# Patient Record
Sex: FEMALE
[De-identification: names, ages, dates, MRNs, and addresses within clinical notes are randomized per-mention and may not be internally consistent; named-entity substitution may affect disease eponyms.]

---

## 2017-09-12 ENCOUNTER — APPOINTMENT (OUTPATIENT)
Dept: OBGYN | Facility: CLINIC | Age: 72
End: 2017-09-12
Payer: MEDICARE

## 2017-09-12 VITALS
DIASTOLIC BLOOD PRESSURE: 80 MMHG | SYSTOLIC BLOOD PRESSURE: 154 MMHG | BODY MASS INDEX: 18.12 KG/M2 | WEIGHT: 96 LBS | HEIGHT: 61 IN

## 2017-09-12 DIAGNOSIS — Z86.79 PERSONAL HISTORY OF OTHER DISEASES OF THE CIRCULATORY SYSTEM: ICD-10-CM

## 2017-09-12 DIAGNOSIS — Z01.419 ENCOUNTER FOR GYNECOLOGICAL EXAMINATION (GENERAL) (ROUTINE) W/OUT ABNORMAL FINDINGS: ICD-10-CM

## 2017-09-12 DIAGNOSIS — Z82.5 FAMILY HISTORY OF ASTHMA AND OTHER CHRONIC LOWER RESPIRATORY DISEASES: ICD-10-CM

## 2017-09-12 DIAGNOSIS — Z80.3 FAMILY HISTORY OF MALIGNANT NEOPLASM OF BREAST: ICD-10-CM

## 2017-09-12 DIAGNOSIS — Z86.39 PERSONAL HISTORY OF OTHER ENDOCRINE, NUTRITIONAL AND METABOLIC DISEASE: ICD-10-CM

## 2017-09-12 DIAGNOSIS — Z80.8 FAMILY HISTORY OF MALIGNANT NEOPLASM OF OTHER ORGANS OR SYSTEMS: ICD-10-CM

## 2017-09-12 DIAGNOSIS — Z78.9 OTHER SPECIFIED HEALTH STATUS: ICD-10-CM

## 2017-09-12 PROCEDURE — G0101: CPT

## 2017-09-13 PROBLEM — Z80.3 FAMILY HISTORY OF MALIGNANT NEOPLASM OF BREAST: Status: ACTIVE | Noted: 2017-09-13

## 2017-09-13 PROBLEM — Z86.79 HISTORY OF HYPERTENSION: Status: RESOLVED | Noted: 2017-09-13 | Resolved: 2017-09-13

## 2017-09-13 PROBLEM — Z80.8 FAMILY HISTORY OF BONE CANCER: Status: ACTIVE | Noted: 2017-09-13

## 2017-09-13 PROBLEM — Z86.39 HISTORY OF HYPERCHOLESTEROLEMIA: Status: RESOLVED | Noted: 2017-09-13 | Resolved: 2017-09-13

## 2017-09-13 PROBLEM — Z82.5 FAMILY HISTORY OF EMPHYSEMA: Status: ACTIVE | Noted: 2017-09-13

## 2017-09-13 PROBLEM — Z78.9 NON-SMOKER: Status: ACTIVE | Noted: 2017-09-13

## 2017-09-13 RX ORDER — ATENOLOL 50 MG/1
TABLET ORAL
Refills: 0 | Status: ACTIVE | COMMUNITY

## 2017-09-13 RX ORDER — NEBIVOLOL HYDROCHLORIDE 20 MG/1
TABLET ORAL
Refills: 0 | Status: ACTIVE | COMMUNITY

## 2017-09-13 RX ORDER — ICOSAPENT ETHYL 500 MG/1
CAPSULE ORAL
Refills: 0 | Status: ACTIVE | COMMUNITY

## 2017-09-13 RX ORDER — AMLODIPINE BESYLATE 5 MG/1
TABLET ORAL
Refills: 0 | Status: ACTIVE | COMMUNITY

## 2017-09-19 LAB — CYTOLOGY CVX/VAG DOC THIN PREP: NORMAL

## 2020-12-15 PROBLEM — Z01.419 ENCOUNTER FOR GYNECOLOGICAL EXAMINATION WITHOUT ABNORMAL FINDING: Status: RESOLVED | Noted: 2017-09-13 | Resolved: 2020-12-15

## 2022-11-14 ENCOUNTER — OFFICE (OUTPATIENT)
Dept: URBAN - METROPOLITAN AREA CLINIC 90 | Facility: CLINIC | Age: 77
Setting detail: OPHTHALMOLOGY
End: 2022-11-14
Payer: MEDICARE

## 2022-11-14 DIAGNOSIS — H16.223: ICD-10-CM

## 2022-11-14 DIAGNOSIS — H40.013: ICD-10-CM

## 2022-11-14 DIAGNOSIS — E78.01: ICD-10-CM

## 2022-11-14 DIAGNOSIS — H35.371: ICD-10-CM

## 2022-11-14 DIAGNOSIS — H18.513: ICD-10-CM

## 2022-11-14 DIAGNOSIS — H43.393: ICD-10-CM

## 2022-11-14 DIAGNOSIS — H25.13: ICD-10-CM

## 2022-11-14 PROCEDURE — 92250 FUNDUS PHOTOGRAPHY W/I&R: CPT | Performed by: OPHTHALMOLOGY

## 2022-11-14 PROCEDURE — 92014 COMPRE OPH EXAM EST PT 1/>: CPT | Performed by: OPHTHALMOLOGY

## 2022-11-14 ASSESSMENT — REFRACTION_MANIFEST
OD_VA1: 20/20-1
OS_ADD: +2.75
OS_CYLINDER: -0.50
OD_VA1: 20/20
OD_SPHERE: +3.75
OS_AXIS: 100
OS_VA2: 20/20
OD_VA2: 20/20
OD_ADD: +3.00
OD_AXIS: 090
OS_CYLINDER: -0.75
OD_AXIS: 095
OD_AXIS: 100
OD_VA2: 20/30
OS_ADD: +3.00
OS_VA2: 20/30
OD_CYLINDER: -1.75
OD_CYLINDER: -1.75
OS_SPHERE: +3.25
OS_VA1: 20/20
OD_SPHERE: +3.50
OD_CYLINDER: -1.00
OS_VA1: 20/30
OS_SPHERE: +3.25
OS_CYLINDER: -0.75
OS_AXIS: 090
OS_VA1: 20/20
OD_VA1: 20/30+2
OD_ADD: +2.75
OS_AXIS: 105
OD_SPHERE: +2.75
OS_SPHERE: +3.25

## 2022-11-14 ASSESSMENT — REFRACTION_AUTOREFRACTION
OS_CYLINDER: -0.50
OD_SPHERE: +3.25
OD_AXIS: 093
OS_AXIS: 088
OS_SPHERE: +3.25
OD_CYLINDER: -1.50

## 2022-11-14 ASSESSMENT — REFRACTION_CURRENTRX
OD_SPHERE: +3.00
OD_ADD: +2.75
OS_AXIS: 090
OD_SPHERE: +2.75
OD_SPHERE: +2.00
OS_CYLINDER: -0.75
OS_AXIS: 102
OD_VPRISM_DIRECTION: PROGS
OS_CYLINDER: -0.50
OD_VPRISM_DIRECTION: PROGS
OD_CYLINDER: SPH
OS_CYLINDER: -0.75
OD_AXIS: 100
OD_ADD: +3.00
OS_VPRISM_DIRECTION: PROGS
OD_ADD: +2.75
OS_SPHERE: +3.50
OD_CYLINDER: -1.00
OD_OVR_VA: 20/
OD_OVR_VA: 20/
OS_VPRISM_DIRECTION: PROGS
OS_OVR_VA: 20/
OD_VPRISM_DIRECTION: PROGS
OS_SPHERE: +3.25
OS_ADD: +2.75
OS_SPHERE: +2.00
OD_OVR_VA: 20/
OS_OVR_VA: 20/
OS_ADD: +3.00
OD_AXIS: 096
OS_VPRISM_DIRECTION: PROGS
OS_AXIS: 115
OS_OVR_VA: 20/
OS_ADD: +2.75
OD_CYLINDER: -1.00

## 2022-11-14 ASSESSMENT — SPHEQUIV_DERIVED
OS_SPHEQUIV: 2.875
OD_SPHEQUIV: 2.5
OS_SPHEQUIV: 3
OD_SPHEQUIV: 2.875
OD_SPHEQUIV: 2.625
OD_SPHEQUIV: 2.25
OS_SPHEQUIV: 2.875
OS_SPHEQUIV: 3

## 2022-11-14 ASSESSMENT — CONFRONTATIONAL VISUAL FIELD TEST (CVF)
OD_FINDINGS: FULL
OS_FINDINGS: FULL

## 2022-11-14 ASSESSMENT — TONOMETRY
OD_IOP_MMHG: 12
OS_IOP_MMHG: 12

## 2022-11-14 ASSESSMENT — AXIALLENGTH_DERIVED
OS_AL: 22.1497
OS_AL: 22.1497
OD_AL: 22.4103
OD_AL: 22.2792
OD_AL: 22.1927
OS_AL: 22.1927
OS_AL: 22.1927
OD_AL: 22.3227

## 2022-11-14 ASSESSMENT — KERATOMETRY
OD_AXISANGLE_DEGREES: 044
OS_AXISANGLE_DEGREES: 101
OD_K2POWER_DIOPTERS: 44.75
OS_K1POWER_DIOPTERS: 44.00
METHOD_AUTO_MANUAL: AUTO
OS_K2POWER_DIOPTERS: 45.00
OD_K1POWER_DIOPTERS: 44.25

## 2022-11-14 ASSESSMENT — SUPERFICIAL PUNCTATE KERATITIS (SPK)
OD_SPK: T
OS_SPK: T

## 2022-11-14 ASSESSMENT — CORNEAL DYSTROPHY - POSTERIOR
OS_POSTERIORDYSTROPHY: T GUTTATA
OD_POSTERIORDYSTROPHY: T GUTTATA

## 2022-11-14 ASSESSMENT — VISUAL ACUITY
OS_BCVA: 20/20-1
OD_BCVA: 20/20-1

## 2022-11-14 ASSESSMENT — PACHYMETRY
OS_CT_CORRECTION: 3
OD_CT_CORRECTION: 5
OD_CT_UM: 479
OS_CT_UM: 503

## 2023-05-15 ENCOUNTER — OFFICE (OUTPATIENT)
Dept: URBAN - METROPOLITAN AREA CLINIC 90 | Facility: CLINIC | Age: 78
Setting detail: OPHTHALMOLOGY
End: 2023-05-15
Payer: MEDICARE

## 2023-05-15 DIAGNOSIS — H40.013: ICD-10-CM

## 2023-05-15 PROBLEM — H43.391 VITREOUS FLOATERS; RIGHT EYE: Status: ACTIVE | Noted: 2023-05-15

## 2023-05-15 PROCEDURE — 92014 COMPRE OPH EXAM EST PT 1/>: CPT | Performed by: OPHTHALMOLOGY

## 2023-05-15 PROCEDURE — 92133 CPTRZD OPH DX IMG PST SGM ON: CPT | Performed by: OPHTHALMOLOGY

## 2023-05-15 PROCEDURE — 92083 EXTENDED VISUAL FIELD XM: CPT | Performed by: OPHTHALMOLOGY

## 2023-05-15 ASSESSMENT — AXIALLENGTH_DERIVED
OD_AL: 22.1927
OD_AL: 22.2792
OD_AL: 22.4103
OS_AL: 22.1927
OS_AL: 22.1927
OD_AL: 22.2792
OS_AL: 22.1927
OS_AL: 22.1497

## 2023-05-15 ASSESSMENT — REFRACTION_CURRENTRX
OD_VPRISM_DIRECTION: PROGS
OS_OVR_VA: 20/
OD_SPHERE: +3.00
OD_ADD: +2.75
OS_SPHERE: +2.00
OS_ADD: +2.75
OS_CYLINDER: -0.75
OS_CYLINDER: -0.75
OD_ADD: +3.00
OS_CYLINDER: -0.75
OD_ADD: +2.75
OS_OVR_VA: 20/
OS_AXIS: 090
OS_ADD: +3.00
OS_AXIS: 102
OS_VPRISM_DIRECTION: PROGS
OD_VPRISM_DIRECTION: PROGS
OD_OVR_VA: 20/
OD_AXIS: 100
OS_OVR_VA: 20/
OS_AXIS: 115
OD_CYLINDER: SPH
OS_CYLINDER: -0.50
OS_SPHERE: +3.50
OS_VPRISM_DIRECTION: PROGS
OD_SPHERE: +3.00
OD_SPHERE: +2.75
OS_AXIS: 102
OS_ADD: +2.75
OS_VPRISM_DIRECTION: PROGS
OD_VPRISM_DIRECTION: PROGS
OS_SPHERE: +3.25
OS_ADD: +2.50
OS_SPHERE: +3.50
OD_OVR_VA: 20/
OD_ADD: +2.50
OD_AXIS: 098
OD_CYLINDER: -1.00
OD_SPHERE: +2.00
OD_CYLINDER: -1.00
OD_CYLINDER: -1.25
OD_AXIS: 096
OD_OVR_VA: 20/

## 2023-05-15 ASSESSMENT — REFRACTION_MANIFEST
OD_VA2: 20/20
OD_CYLINDER: -1.75
OD_VA1: 20/20
OS_VA2: 20/30
OS_SPHERE: +3.25
OD_SPHERE: +2.75
OS_ADD: +2.75
OS_CYLINDER: -0.75
OD_VA1: 20/30+2
OD_ADD: +3.00
OD_AXIS: 090
OD_CYLINDER: -1.00
OS_VA1: 20/20
OD_VA1: 20/20-1
OS_CYLINDER: -0.75
OS_SPHERE: +3.25
OS_AXIS: 100
OS_VA2: 20/20
OS_VA1: 20/20
OS_AXIS: 105
OS_AXIS: 090
OD_AXIS: 100
OD_ADD: +2.75
OS_ADD: +3.00
OD_SPHERE: +3.50
OS_VA1: 20/30
OS_SPHERE: +3.25
OD_VA2: 20/30
OD_CYLINDER: -1.75
OD_SPHERE: +3.75
OD_AXIS: 095
OS_CYLINDER: -0.50

## 2023-05-15 ASSESSMENT — CORNEAL DYSTROPHY - POSTERIOR
OS_POSTERIORDYSTROPHY: T GUTTATA
OD_POSTERIORDYSTROPHY: T GUTTATA

## 2023-05-15 ASSESSMENT — REFRACTION_AUTOREFRACTION
OD_SPHERE: +3.75
OD_CYLINDER: -2.25
OD_AXIS: 093
OS_AXIS: 090
OS_CYLINDER: -1.25
OS_SPHERE: +3.50

## 2023-05-15 ASSESSMENT — PACHYMETRY
OS_CT_UM: 503
OD_CT_UM: 479
OS_CT_CORRECTION: 3
OD_CT_CORRECTION: 5

## 2023-05-15 ASSESSMENT — KERATOMETRY
OS_K2POWER_DIOPTERS: 44.75
OS_K1POWER_DIOPTERS: 44.25
OD_K1POWER_DIOPTERS: 44.00
OD_AXISANGLE_DEGREES: 020
OD_K2POWER_DIOPTERS: 45.00
METHOD_AUTO_MANUAL: AUTO
OS_AXISANGLE_DEGREES: 086

## 2023-05-15 ASSESSMENT — SPHEQUIV_DERIVED
OD_SPHEQUIV: 2.625
OD_SPHEQUIV: 2.625
OS_SPHEQUIV: 2.875
OD_SPHEQUIV: 2.25
OD_SPHEQUIV: 2.875
OS_SPHEQUIV: 2.875
OS_SPHEQUIV: 3
OS_SPHEQUIV: 2.875

## 2023-05-15 ASSESSMENT — TONOMETRY
OS_IOP_MMHG: 13
OD_IOP_MMHG: 12

## 2023-05-15 ASSESSMENT — VISUAL ACUITY
OD_BCVA: 20/20-1
OS_BCVA: 20/30

## 2023-05-15 ASSESSMENT — CONFRONTATIONAL VISUAL FIELD TEST (CVF)
OD_FINDINGS: FULL
OS_FINDINGS: FULL

## 2023-08-16 ENCOUNTER — OFFICE (OUTPATIENT)
Dept: URBAN - METROPOLITAN AREA CLINIC 90 | Facility: CLINIC | Age: 78
Setting detail: OPHTHALMOLOGY
End: 2023-08-16
Payer: MEDICARE

## 2023-08-16 DIAGNOSIS — H43.393: ICD-10-CM

## 2023-08-16 DIAGNOSIS — H18.513: ICD-10-CM

## 2023-08-16 DIAGNOSIS — H25.13: ICD-10-CM

## 2023-08-16 DIAGNOSIS — E78.01: ICD-10-CM

## 2023-08-16 DIAGNOSIS — H52.4: ICD-10-CM

## 2023-08-16 DIAGNOSIS — H40.013: ICD-10-CM

## 2023-08-16 DIAGNOSIS — H35.371: ICD-10-CM

## 2023-08-16 PROCEDURE — 92015 DETERMINE REFRACTIVE STATE: CPT | Performed by: OPHTHALMOLOGY

## 2023-08-16 PROCEDURE — 92012 INTRM OPH EXAM EST PATIENT: CPT | Performed by: OPHTHALMOLOGY

## 2023-08-16 PROCEDURE — 92083 EXTENDED VISUAL FIELD XM: CPT | Performed by: OPHTHALMOLOGY

## 2023-08-16 ASSESSMENT — REFRACTION_CURRENTRX
OD_CYLINDER: -1.00
OS_SPHERE: +2.00
OS_OVR_VA: 20/
OD_SPHERE: +3.00
OS_AXIS: 102
OD_CYLINDER: -1.25
OD_OVR_VA: 20/
OS_AXIS: 115
OS_SPHERE: +3.50
OD_AXIS: 098
OS_ADD: +2.75
OS_ADD: +3.00
OD_AXIS: 098
OS_AXIS: 107
OS_OVR_VA: 20/
OD_VPRISM_DIRECTION: PROGS
OS_OVR_VA: 20/
OD_VPRISM_DIRECTION: PROGS
OS_CYLINDER: -0.75
OD_OVR_VA: 20/
OD_ADD: +2.75
OS_SPHERE: +3.25
OD_SPHERE: +2.00
OD_ADD: +3.00
OS_CYLINDER: -0.50
OD_CYLINDER: SPH
OD_AXIS: 100
OS_ADD: +2.50
OS_VPRISM_DIRECTION: PROGS
OS_SPHERE: +3.25
OS_ADD: +2.75
OD_ADD: +2.75
OS_VPRISM_DIRECTION: PROGS
OS_AXIS: 090
OD_SPHERE: +3.00
OD_OVR_VA: 20/
OD_VPRISM_DIRECTION: PROGS
OS_VPRISM_DIRECTION: PROGS
OS_CYLINDER: -0.75
OS_CYLINDER: -0.75
OD_ADD: +2.50
OD_SPHERE: +2.75
OD_CYLINDER: -0.75

## 2023-08-16 ASSESSMENT — KERATOMETRY
OS_AXISANGLE_DEGREES: 092
OS_K1POWER_DIOPTERS: 44.00
OD_K1POWER_DIOPTERS: 44.50
METHOD_AUTO_MANUAL: AUTO
OS_K2POWER_DIOPTERS: 45.00
OD_K2POWER_DIOPTERS: 45.00
OD_AXISANGLE_DEGREES: 061

## 2023-08-16 ASSESSMENT — SPHEQUIV_DERIVED
OS_SPHEQUIV: 2.875
OD_SPHEQUIV: 2.875
OD_SPHEQUIV: 2.625
OD_SPHEQUIV: 2.25
OS_SPHEQUIV: 2.875
OS_SPHEQUIV: 3
OD_SPHEQUIV: 2.625
OS_SPHEQUIV: 2.875
OS_SPHEQUIV: 2.875
OD_SPHEQUIV: 2.625

## 2023-08-16 ASSESSMENT — REFRACTION_MANIFEST
OD_VA1: 20/20-1
OS_SPHERE: +3.25
OS_VA2: 20/20
OD_CYLINDER: -0.75
OS_ADD: +3.00
OD_ADD: +2.75
OS_VA2: 20/25(J1)
OD_AXIS: 100
OD_ADD: +3.00
OS_CYLINDER: -0.75
OD_AXIS: 100
OS_CYLINDER: -0.75
OD_VA2: 20/20
OS_ADD: +2.50
OS_SPHERE: +3.25
OD_CYLINDER: -1.75
OD_SPHERE: +3.75
OS_AXIS: 090
OS_VA1: 20/20
OS_AXIS: 100
OS_VA1: 20/20-2
OS_SPHERE: +3.25
OS_AXIS: 105
OD_SPHERE: +3.00
OD_SPHERE: +3.50
OS_VA2: 20/30
OS_VA1: 20/20
OD_AXIS: 095
OS_SPHERE: +3.25
OS_CYLINDER: -0.50
OD_VA2: 20/30
OS_ADD: +2.75
OD_CYLINDER: -1.00
OS_AXIS: 090
OD_VA2: 20/25(J1)
OD_AXIS: 090
OS_VA1: 20/30
OD_SPHERE: +2.75
OD_VA1: 20/20
OD_VA1: 20/30+2
OD_ADD: +2.50
OS_CYLINDER: -0.75
OD_VA1: 20/25+
OD_CYLINDER: -1.75

## 2023-08-16 ASSESSMENT — CORNEAL DYSTROPHY - POSTERIOR
OS_POSTERIORDYSTROPHY: T GUTTATA
OD_POSTERIORDYSTROPHY: T GUTTATA

## 2023-08-16 ASSESSMENT — REFRACTION_AUTOREFRACTION
OD_SPHERE: +3.50
OS_CYLINDER: -0.75
OD_AXIS: 095
OD_CYLINDER: -1.75
OS_SPHERE: +3.25
OS_AXIS: 085

## 2023-08-16 ASSESSMENT — AXIALLENGTH_DERIVED
OS_AL: 22.1927
OD_AL: 22.1976
OS_AL: 22.1927
OD_AL: 22.1117
OS_AL: 22.1927
OS_AL: 22.1497
OD_AL: 22.1976
OD_AL: 22.3277
OD_AL: 22.1976
OS_AL: 22.1927

## 2023-08-16 ASSESSMENT — TONOMETRY
OD_IOP_MMHG: 12
OS_IOP_MMHG: 12

## 2023-08-16 ASSESSMENT — PACHYMETRY
OD_CT_UM: 479
OD_CT_CORRECTION: 5
OS_CT_UM: 503
OS_CT_CORRECTION: 3

## 2023-08-16 ASSESSMENT — VISUAL ACUITY
OD_BCVA: 20/20-2
OS_BCVA: 20/40-2

## 2023-08-16 ASSESSMENT — CONFRONTATIONAL VISUAL FIELD TEST (CVF)
OD_FINDINGS: FULL
OS_FINDINGS: FULL

## 2024-02-21 ENCOUNTER — OFFICE (OUTPATIENT)
Dept: URBAN - METROPOLITAN AREA CLINIC 90 | Facility: CLINIC | Age: 79
Setting detail: OPHTHALMOLOGY
End: 2024-02-21
Payer: MEDICARE

## 2024-02-21 DIAGNOSIS — E78.01: ICD-10-CM

## 2024-02-21 DIAGNOSIS — H35.033: ICD-10-CM

## 2024-02-21 DIAGNOSIS — H43.393: ICD-10-CM

## 2024-02-21 DIAGNOSIS — H25.13: ICD-10-CM

## 2024-02-21 DIAGNOSIS — H40.013: ICD-10-CM

## 2024-02-21 DIAGNOSIS — H18.513: ICD-10-CM

## 2024-02-21 DIAGNOSIS — H35.371: ICD-10-CM

## 2024-02-21 PROCEDURE — 92014 COMPRE OPH EXAM EST PT 1/>: CPT | Performed by: OPHTHALMOLOGY

## 2024-02-21 PROCEDURE — 92250 FUNDUS PHOTOGRAPHY W/I&R: CPT | Performed by: OPHTHALMOLOGY

## 2024-02-21 ASSESSMENT — REFRACTION_MANIFEST
OS_CYLINDER: -0.75
OS_VA2: 20/25(J1)
OD_CYLINDER: -0.75
OD_ADD: +2.75
OD_VA2: 20/20
OS_SPHERE: +3.25
OD_ADD: +3.00
OS_CYLINDER: -0.75
OS_VA1: 20/20
OD_VA2: 20/25(J1)
OD_CYLINDER: -1.75
OS_ADD: +2.50
OS_VA2: 20/20
OS_ADD: +3.00
OS_AXIS: 090
OD_ADD: +2.50
OS_VA1: 20/30
OD_VA1: 20/20
OD_SPHERE: +3.00
OD_AXIS: 095
OD_CYLINDER: -1.00
OS_SPHERE: +3.25
OD_AXIS: 100
OS_AXIS: 105
OD_SPHERE: +2.75
OS_AXIS: 100
OD_VA1: 20/25+
OS_VA1: 20/20-2
OS_CYLINDER: -0.75
OS_ADD: +2.75
OS_VA2: 20/30
OS_AXIS: 090
OS_SPHERE: +3.25
OS_CYLINDER: -0.50
OD_CYLINDER: -1.75
OD_VA1: 20/30+2
OD_SPHERE: +3.75
OS_VA1: 20/20
OD_VA2: 20/30
OD_SPHERE: +3.50
OD_VA1: 20/20-1
OD_AXIS: 090
OS_SPHERE: +3.25
OD_AXIS: 100

## 2024-02-21 ASSESSMENT — REFRACTION_CURRENTRX
OD_CYLINDER: -1.25
OS_CYLINDER: -0.75
OD_OVR_VA: 20/
OD_SPHERE: +2.75
OS_SPHERE: +2.00
OS_ADD: +2.75
OD_AXIS: 098
OS_OVR_VA: 20/
OD_SPHERE: +3.00
OS_OVR_VA: 20/
OS_AXIS: 090
OD_CYLINDER: SPH
OS_VPRISM_DIRECTION: PROGS
OS_ADD: +2.50
OD_AXIS: 100
OS_SPHERE: +3.25
OD_ADD: +3.00
OD_CYLINDER: -1.00
OS_AXIS: 107
OD_CYLINDER: -0.75
OD_ADD: +2.75
OS_CYLINDER: -0.75
OD_SPHERE: +2.00
OS_CYLINDER: -0.50
OD_VPRISM_DIRECTION: PROGS
OD_OVR_VA: 20/
OS_ADD: +3.00
OS_OVR_VA: 20/
OD_SPHERE: +3.00
OD_OVR_VA: 20/
OS_SPHERE: +3.25
OS_VPRISM_DIRECTION: PROGS
OS_ADD: +2.75
OS_AXIS: 102
OS_AXIS: 115
OS_CYLINDER: -0.75
OS_SPHERE: +3.50
OD_VPRISM_DIRECTION: PROGS
OD_VPRISM_DIRECTION: PROGS
OD_ADD: +2.50
OD_ADD: +2.75
OD_AXIS: 098
OS_VPRISM_DIRECTION: PROGS

## 2024-02-21 ASSESSMENT — REFRACTION_AUTOREFRACTION
OS_AXIS: 097
OD_CYLINDER: -2.00
OS_CYLINDER: -0.75
OD_AXIS: 091
OD_SPHERE: +3.75
OS_SPHERE: +3.25

## 2024-02-21 ASSESSMENT — SPHEQUIV_DERIVED
OD_SPHEQUIV: 2.625
OD_SPHEQUIV: 2.75
OS_SPHEQUIV: 2.875
OD_SPHEQUIV: 2.875
OS_SPHEQUIV: 2.875
OS_SPHEQUIV: 2.875
OD_SPHEQUIV: 2.25
OD_SPHEQUIV: 2.625
OS_SPHEQUIV: 2.875
OS_SPHEQUIV: 3

## 2024-02-21 ASSESSMENT — CONFRONTATIONAL VISUAL FIELD TEST (CVF)
OD_FINDINGS: FULL
OS_FINDINGS: FULL

## 2024-02-21 ASSESSMENT — CORNEAL DYSTROPHY - POSTERIOR
OS_POSTERIORDYSTROPHY: T GUTTATA
OD_POSTERIORDYSTROPHY: T GUTTATA

## 2024-05-14 ENCOUNTER — APPOINTMENT (OUTPATIENT)
Dept: UROLOGY | Facility: CLINIC | Age: 79
End: 2024-05-14
Payer: MEDICARE

## 2024-05-14 VITALS
SYSTOLIC BLOOD PRESSURE: 157 MMHG | RESPIRATION RATE: 17 BRPM | WEIGHT: 97 LBS | HEIGHT: 61 IN | HEART RATE: 70 BPM | BODY MASS INDEX: 18.31 KG/M2 | TEMPERATURE: 98 F | DIASTOLIC BLOOD PRESSURE: 67 MMHG

## 2024-05-14 DIAGNOSIS — N28.89 OTHER SPECIFIED DISORDERS OF KIDNEY AND URETER: ICD-10-CM

## 2024-05-14 PROCEDURE — 99203 OFFICE O/P NEW LOW 30 MIN: CPT

## 2024-05-14 NOTE — ASSESSMENT
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 plan for MRI to confirm

## 2024-05-14 NOTE — HISTORY OF PRESENT ILLNESS
[FreeTextEntry1] : referred for management of incidentally renal mass H/o CRF with GFR 39 - had ULS noting a 2cm echogenic mass right kidney in addition to a cyst in same kidney. no prior renal imaging. No h/o flank pain or hematuria. Ho h/o stones or UTIs.

## 2024-06-16 ENCOUNTER — APPOINTMENT (OUTPATIENT)
Dept: MRI IMAGING | Facility: IMAGING CENTER | Age: 79
End: 2024-06-16
Payer: MEDICARE

## 2024-06-16 ENCOUNTER — OUTPATIENT (OUTPATIENT)
Dept: OUTPATIENT SERVICES | Facility: HOSPITAL | Age: 79
LOS: 1 days | End: 2024-06-16
Payer: MEDICARE

## 2024-06-16 ENCOUNTER — TRANSCRIPTION ENCOUNTER (OUTPATIENT)
Age: 79
End: 2024-06-16

## 2024-06-16 DIAGNOSIS — N28.89 OTHER SPECIFIED DISORDERS OF KIDNEY AND URETER: ICD-10-CM

## 2024-06-16 PROCEDURE — A9585: CPT

## 2024-06-16 PROCEDURE — 74183 MRI ABD W/O CNTR FLWD CNTR: CPT | Mod: 26,MH

## 2024-06-16 PROCEDURE — 74183 MRI ABD W/O CNTR FLWD CNTR: CPT

## 2024-08-21 ENCOUNTER — OFFICE (OUTPATIENT)
Dept: URBAN - METROPOLITAN AREA CLINIC 90 | Facility: CLINIC | Age: 79
Setting detail: OPHTHALMOLOGY
End: 2024-08-21
Payer: MEDICARE

## 2024-08-21 DIAGNOSIS — H40.013: ICD-10-CM

## 2024-08-21 DIAGNOSIS — H18.513: ICD-10-CM

## 2024-08-21 DIAGNOSIS — H35.033: ICD-10-CM

## 2024-08-21 DIAGNOSIS — E78.01: ICD-10-CM

## 2024-08-21 DIAGNOSIS — H35.371: ICD-10-CM

## 2024-08-21 DIAGNOSIS — H43.811: ICD-10-CM

## 2024-08-21 DIAGNOSIS — H43.393: ICD-10-CM

## 2024-08-21 DIAGNOSIS — H25.13: ICD-10-CM

## 2024-08-21 PROCEDURE — 92083 EXTENDED VISUAL FIELD XM: CPT | Performed by: OPHTHALMOLOGY

## 2024-08-21 PROCEDURE — 92133 CPTRZD OPH DX IMG PST SGM ON: CPT | Performed by: OPHTHALMOLOGY

## 2024-08-21 PROCEDURE — 92012 INTRM OPH EXAM EST PATIENT: CPT | Performed by: OPHTHALMOLOGY

## 2024-08-21 ASSESSMENT — CONFRONTATIONAL VISUAL FIELD TEST (CVF)
OS_FINDINGS: FULL
OD_FINDINGS: FULL

## 2025-02-26 ENCOUNTER — OFFICE (OUTPATIENT)
Facility: LOCATION | Age: 80
Setting detail: OPHTHALMOLOGY
End: 2025-02-26
Payer: MEDICARE

## 2025-02-26 DIAGNOSIS — H43.393: ICD-10-CM

## 2025-02-26 DIAGNOSIS — H35.033: ICD-10-CM

## 2025-02-26 DIAGNOSIS — H25.13: ICD-10-CM

## 2025-02-26 DIAGNOSIS — H18.513: ICD-10-CM

## 2025-02-26 DIAGNOSIS — H35.371: ICD-10-CM

## 2025-02-26 DIAGNOSIS — H40.013: ICD-10-CM

## 2025-02-26 DIAGNOSIS — E78.01: ICD-10-CM

## 2025-02-26 DIAGNOSIS — H43.811: ICD-10-CM

## 2025-02-26 PROBLEM — H52.7 REFRACTIVE ERROR: Status: ACTIVE | Noted: 2025-02-26

## 2025-02-26 PROCEDURE — 92014 COMPRE OPH EXAM EST PT 1/>: CPT | Performed by: OPHTHALMOLOGY

## 2025-02-26 ASSESSMENT — REFRACTION_MANIFEST
OS_CYLINDER: -0.50
OD_CYLINDER: -1.75
OS_CYLINDER: -0.75
OS_CYLINDER: -0.75
OS_VA2: 20/25(J1)
OS_VA2: 20/20
OS_AXIS: 090
OS_SPHERE: +3.25
OD_VA1: 20/25+
OD_CYLINDER: -1.00
OS_AXIS: 100
OS_CYLINDER: -0.75
OS_AXIS: 105
OD_AXIS: 090
OS_SPHERE: +3.25
OD_CYLINDER: -1.75
OS_VA1: 20/20-2
OS_ADD: +2.50
OD_VA2: 20/20
OS_VA1: 20/20
OD_SPHERE: +2.75
OS_VA1: 20/20
OD_AXIS: 100
OD_SPHERE: +3.00
OS_SPHERE: +3.25
OS_VA1: 20/30
OS_AXIS: 090
OD_AXIS: 100
OD_SPHERE: +3.75
OD_ADD: +3.00
OD_ADD: +2.75
OS_ADD: +2.75
OD_VA2: 20/25(J1)
OD_VA2: 20/30
OD_VA1: 20/20-1
OD_SPHERE: +3.50
OS_ADD: +3.00
OD_AXIS: 100
OD_CYLINDER: -0.75
OD_ADD: +2.50
OS_AXIS: 090
OD_VA2: 20/25(J1)
OD_VA1: 20/30+2
OS_VA2: 20/25(J1)
OS_SPHERE: +3.25
OS_SPHERE: +3.25
OS_CYLINDER: -0.75
OD_VA1: 20/25+
OS_VA2: 20/30
OD_AXIS: 095
OD_SPHERE: +3.00
OD_VA1: 20/20
OS_VA1: 20/20-2
OD_CYLINDER: -0.75
OS_ADD: +2.50
OD_ADD: +2.50

## 2025-02-26 ASSESSMENT — REFRACTION_CURRENTRX
OS_SPHERE: +3.25
OS_CYLINDER: -0.75
OS_CYLINDER: -0.75
OS_VPRISM_DIRECTION: PROGS
OD_ADD: +3.00
OS_AXIS: 090
OS_AXIS: 088
OD_CYLINDER: -1.25
OD_SPHERE: +2.75
OD_SPHERE: +3.00
OD_VPRISM_DIRECTION: PROGS
OS_ADD: +2.75
OS_OVR_VA: 20/
OD_VPRISM_DIRECTION: PROGS
OS_ADD: +2.75
OD_ADD: +2.25
OS_ADD: +3.00
OD_AXIS: 100
OD_SPHERE: +3.00
OD_AXIS: 103
OD_VPRISM_DIRECTION: PROGS
OD_CYLINDER: -0.75
OD_ADD: +2.50
OS_SPHERE: +2.00
OS_SPHERE: +3.50
OD_ADD: +2.75
OD_CYLINDER: SPH
OS_ADD: +2.25
OD_AXIS: 105
OS_SPHERE: +3.25
OS_VPRISM_DIRECTION: PROGS
OD_SPHERE: +2.00
OD_CYLINDER: -0.75
OS_AXIS: 102
OD_OVR_VA: 20/
OD_SPHERE: +3.00
OD_CYLINDER: -1.00
OS_SPHERE: +3.25
OS_ADD: +2.50
OS_AXIS: 090
OD_SPHERE: +3.00
OS_CYLINDER: -0.50
OD_ADD: +2.75
OS_VPRISM_DIRECTION: PROGS
OD_AXIS: 098
OS_CYLINDER: -0.75
OD_CYLINDER: -0.75
OD_AXIS: 098
OS_ADD: +2.25
OD_ADD: +2.25
OD_VPRISM_DIRECTION: PROGS
OS_OVR_VA: 20/
OS_OVR_VA: 20/
OD_OVR_VA: 20/
OS_SPHERE: +3.50
OS_VPRISM_DIRECTION: PROGS
OS_CYLINDER: -0.75
OD_OVR_VA: 20/
OS_AXIS: 115
OD_VPRISM_DIRECTION: PROGS
OS_AXIS: 107
OS_CYLINDER: -1.00
OS_VPRISM_DIRECTION: PROGS

## 2025-02-26 ASSESSMENT — VISUAL ACUITY
OS_BCVA: 20/20-2
OD_BCVA: 20/20-2

## 2025-02-26 ASSESSMENT — CORNEAL DYSTROPHY - POSTERIOR
OS_POSTERIORDYSTROPHY: T GUTTATA
OD_POSTERIORDYSTROPHY: T GUTTATA

## 2025-02-26 ASSESSMENT — CONFRONTATIONAL VISUAL FIELD TEST (CVF)
OS_FINDINGS: FULL
OD_FINDINGS: FULL

## 2025-02-26 ASSESSMENT — PACHYMETRY
OD_CT_CORRECTION: 5
OD_CT_UM: 479
OS_CT_UM: 503
OS_CT_CORRECTION: 3

## 2025-02-26 ASSESSMENT — REFRACTION_AUTOREFRACTION
OD_SPHERE: +4.00
OS_SPHERE: +3.50
OS_CYLINDER: -1.50
OS_AXIS: 089
OD_CYLINDER: -2.50
OD_AXIS: 093

## 2025-02-26 ASSESSMENT — KERATOMETRY
OD_AXISANGLE_DEGREES: 036
OD_K1POWER_DIOPTERS: 44.25
OS_K2POWER_DIOPTERS: 44.25
OS_AXISANGLE_DEGREES: 103
OS_K1POWER_DIOPTERS: 44.00
METHOD_AUTO_MANUAL: AUTO
OD_K2POWER_DIOPTERS: 44.50

## 2025-02-26 ASSESSMENT — TONOMETRY
OD_IOP_MMHG: 11
OS_IOP_MMHG: 10

## 2025-08-27 ENCOUNTER — OFFICE (OUTPATIENT)
Facility: LOCATION | Age: 80
Setting detail: OPHTHALMOLOGY
End: 2025-08-27
Payer: MEDICARE

## 2025-08-27 DIAGNOSIS — H18.513: ICD-10-CM

## 2025-08-27 DIAGNOSIS — H40.013: ICD-10-CM

## 2025-08-27 DIAGNOSIS — H25.13: ICD-10-CM

## 2025-08-27 PROCEDURE — 92133 CPTRZD OPH DX IMG PST SGM ON: CPT | Performed by: OPHTHALMOLOGY

## 2025-08-27 PROCEDURE — 99213 OFFICE O/P EST LOW 20 MIN: CPT | Performed by: OPHTHALMOLOGY

## 2025-08-27 PROCEDURE — 92083 EXTENDED VISUAL FIELD XM: CPT | Performed by: OPHTHALMOLOGY

## 2025-08-27 ASSESSMENT — REFRACTION_CURRENTRX
OD_SPHERE: +2.75
OS_AXIS: 088
OS_VPRISM_DIRECTION: PROGS
OS_AXIS: 088
OD_CYLINDER: SPH
OD_CYLINDER: -0.75
OS_CYLINDER: -1.00
OD_ADD: +2.75
OS_SPHERE: +2.00
OD_OVR_VA: 20/
OS_VPRISM_DIRECTION: PROGS
OD_VPRISM_DIRECTION: PROGS
OS_OVR_VA: 20/
OD_OVR_VA: 20/
OS_CYLINDER: -0.75
OD_VPRISM_DIRECTION: PROGS
OD_VPRISM_DIRECTION: PROGS
OS_OVR_VA: 20/
OD_AXIS: 098
OS_ADD: +2.75
OS_ADD: +2.00
OS_VPRISM_DIRECTION: PROGS
OS_AXIS: 090
OS_SPHERE: +3.50
OD_ADD: +2.25
OD_CYLINDER: -1.00
OD_SPHERE: +3.00
OS_ADD: +2.50
OD_CYLINDER: -1.25
OD_VPRISM_DIRECTION: PROGS
OS_SPHERE: +3.25
OD_ADD: +2.00
OS_OVR_VA: 20/
OD_CYLINDER: -0.75
OS_CYLINDER: -0.50
OD_ADD: +2.50
OS_CYLINDER: -0.75
OD_OVR_VA: 20/
OS_CYLINDER: -0.75
OS_SPHERE: +3.25
OD_SPHERE: +2.00
OS_AXIS: 115
OD_AXIS: 098
OS_SPHERE: +3.25
OD_SPHERE: +3.00
OS_ADD: +3.00
OD_AXIS: 105
OD_AXIS: 100
OS_CYLINDER: -0.75
OS_AXIS: 102
OS_ADD: +2.25
OD_SPHERE: +3.00
OD_AXIS: 098
OD_VPRISM_DIRECTION: PROGS
OD_ADD: +2.75
OS_VPRISM_DIRECTION: PROGS
OD_SPHERE: +3.00
OS_VPRISM_DIRECTION: PROGS
OD_ADD: +3.00
OS_AXIS: 107
OD_CYLINDER: -0.75
OS_ADD: +2.75
OS_SPHERE: +3.50

## 2025-08-27 ASSESSMENT — KERATOMETRY
OD_K2POWER_DIOPTERS: 45.00
OS_K2POWER_DIOPTERS: 44.50
OD_AXISANGLE_DEGREES: 010
OD_K1POWER_DIOPTERS: 43.75
OS_K1POWER_DIOPTERS: 44.25
OS_AXISANGLE_DEGREES: 106
METHOD_AUTO_MANUAL: AUTO

## 2025-08-27 ASSESSMENT — REFRACTION_MANIFEST
OD_SPHERE: +3.00
OD_CYLINDER: -1.75
OD_ADD: +2.50
OD_VA1: 20/25+
OS_AXIS: 105
OS_VA2: 20/25(J1)
OD_CYLINDER: -1.75
OD_AXIS: 100
OD_VA2: 20/25(J1)
OS_VA1: 20/20-2
OS_ADD: +2.50
OS_VA1: 20/20
OD_VA1: 20/20-1
OD_ADD: +3.00
OD_SPHERE: +3.75
OD_SPHERE: +2.75
OD_AXIS: 095
OS_SPHERE: +3.25
OD_SPHERE: +3.00
OD_ADD: +2.50
OD_AXIS: 090
OD_VA2: 20/20
OD_CYLINDER: -0.75
OS_AXIS: 090
OS_AXIS: 090
OS_ADD: +3.00
OS_SPHERE: +3.25
OD_CYLINDER: -1.00
OS_VA1: 20/20-2
OS_VA1: 20/30
OS_CYLINDER: -0.75
OS_VA2: 20/30
OD_SPHERE: +3.50
OS_SPHERE: +3.25
OS_SPHERE: +3.25
OS_ADD: +2.75
OD_VA1: 20/30+2
OS_SPHERE: +3.25
OS_AXIS: 100
OS_VA1: 20/20
OD_VA2: 20/30
OS_VA2: 20/25(J1)
OD_VA1: 20/20
OS_ADD: +2.50
OS_CYLINDER: -0.50
OD_CYLINDER: -0.75
OS_AXIS: 090
OD_ADD: +2.75
OD_VA1: 20/25+
OD_AXIS: 100
OS_CYLINDER: -0.75
OS_CYLINDER: -0.75
OD_AXIS: 100
OS_CYLINDER: -0.75
OD_VA2: 20/25(J1)
OS_VA2: 20/20

## 2025-08-27 ASSESSMENT — REFRACTION_AUTOREFRACTION
OD_CYLINDER: -2.00
OS_SPHERE: +3.25
OD_SPHERE: +3.75
OS_AXIS: 088
OS_CYLINDER: -1.25
OD_AXIS: 091

## 2025-08-27 ASSESSMENT — PACHYMETRY
OD_CT_UM: 479
OS_CT_UM: 503
OS_CT_CORRECTION: 3
OD_CT_CORRECTION: 5

## 2025-08-27 ASSESSMENT — VISUAL ACUITY
OS_BCVA: 20/20
OD_BCVA: 20/20

## 2025-08-27 ASSESSMENT — CONFRONTATIONAL VISUAL FIELD TEST (CVF)
OS_FINDINGS: FULL
OD_FINDINGS: FULL

## 2025-08-27 ASSESSMENT — TONOMETRY
OS_IOP_MMHG: 12
OD_IOP_MMHG: 12

## 2025-08-27 ASSESSMENT — CORNEAL DYSTROPHY - POSTERIOR
OD_POSTERIORDYSTROPHY: T GUTTATA
OS_POSTERIORDYSTROPHY: T GUTTATA